# Patient Record
Sex: MALE | Race: OTHER
[De-identification: names, ages, dates, MRNs, and addresses within clinical notes are randomized per-mention and may not be internally consistent; named-entity substitution may affect disease eponyms.]

---

## 2020-09-28 NOTE — EDM.PDOC
ED [facility] GENERAL MEDICAL PROBLEM





- General


Chief Complaint: Genitourinary Problem


Stated Complaint: testicular pain


Time Seen by Provider: 09/28/20 19:40





- History of Present Illness


INITIAL COMMENTS - FREE TEXT/NARRATIVE: 





HISTORY AND PHYSICAL:





History of present illness:


This 24-year-old male who denies any significant past medical history presents 

to the emergency department complaining of right-sided testicular pain.  He com

plains that he was struck in the testicles and this was an accident.  It was 

done by his son while he was playing.  He was also in the pool doing flips when 

he smacked his testicle.  This was very painful to him and he could not stand 

any longer so he came to the emergency department.  No history of hernias.  No 

hematuria.  Denies any other symptoms.  Rates his pain as moderate.





Review of systems: 


A 10-point review of systems, other than pertinent positives and negatives as 

stated per HPI, is otherwise negative.





Past medical history: 


As per history of present illness and as reviewed below otherwise 

noncontributory.





Surgical history: 


As per history of present illness and as reviewed below otherwise 

noncontributory.





Social history: 


No reported history of drug or alcohol abuse.





Family history: 


As per history of present illness and as reviewed below otherwise 

noncontributory.





Physical exam:


VITAL SIGNS:  Reviewed.


GENERAL: In no apparent distress.


HEAD: No signs of head trauma.


EYES: Pupils are equal.  Extraocular motions intact.


EARS: Hearing grossly intact.


MOUTH: Oropharynx is normal.


NECK: No adenopathy, no JVD.   


CHEST: Chest with clear breath sounds bilaterally.  No wheezes, rales, or 

rhonchi.


CARDIAC: Regular rate and rhythm.  Normal S1 and S2, without murmurs, gallops, 

or rubs.


VASCULAR: Peripheral pulses normal and equal in all extremities.


ABDOMEN: Soft, without detectable tenderness.  No sign of distention.  No   

rebound or guarding, and no masses palpated.


MUSCULOSKELETAL: Good range of motion of all major joints. Extremities without 

clubbing, cyanosis or edema.


NEUROLOGIC EXAM: Alert and oriented x 3.  No focal sensory or motor deficits.  

Speech normal.  Follows commands.


PSYCHIATRIC: Mood normal.


SKIN: No rash or lesions.








Initial Differential Diagnosis & Plan:


Ultrasound, UA, rule out torsion, testicular trauma, atypical presentation of 

mass lesion, epididymitis, varicocele.








Definitive disposition and diagnosis as appropriate pending reevaluation and 

review of above.








  ** left testicle


Pain Score (Numeric/FACES): 6





- Related Data


                                    Allergies











Allergy/AdvReac Type Severity Reaction Status Date / Time


 


No Known Allergies Allergy   Verified 09/28/20 18:57











Home Meds: 


                                    Home Meds





. [No Known Home Meds]  01/30/19 [History]











Past Medical History





- Past Health History


Medical/Surgical History: Denies Medical/Surgical History





- Past Surgical History


Male  Surgical History: Reports: Vasectomy





Social & Family History





- Family History


Family Medical History: Noncontributory





- Tobacco Use


Smoking Status *Q: Never Smoker





- Caffeine Use


Caffeine Use: Reports: None





- Recreational Drug Use


Recreational Drug Use: No





ED ROS GENERAL





- Review of Systems


Review Of Systems: See Below (noted)





ED EXAM, GI/ABD





- Physical Exam


Exam: See Below (noted)





Course





- Vital Signs


Last Recorded V/S: 


                                Last Vital Signs











Temp  96 F L  09/28/20 18:55


 


Pulse  115 H  09/28/20 18:55


 


Resp  14   09/28/20 18:55


 


BP  141/77 H  09/28/20 18:55


 


Pulse Ox  98   09/28/20 18:55








The patient is found to have an epididymal cyst and an epididymal hematoma.  I 

suspect the epididymal cyst is likely an incidental finding.  He is not having 

pain, dysuria, or pyuria.  His UA is essentially normal.  I do not feel that he 

needs empiric treatment for gonorrhea chlamydia at this point.  He had localized

 trauma which explains epididymal hematoma.  No other findings today.





My diagnostic impression:


1.  Epididymal hematoma secondary to localized trauma


2.  Incidental finding of epididymal cyst





Follow-up with urology, support for pain, NSAIDs.





- Orders/Labs/Meds


Orders: 


                               Active Orders 24 hr











 Category Date Time Status


 


 Scrotal Duplex Ltd [US] Routine Exams  09/28/20 Taken


 


 CHLAMYDIA AND GONORRHEA BY [facility]  09/28/20 18:53 Received











Labs: 


                                Laboratory Tests











  09/28/20 Range/Units





  18:53 


 


Urine Color  YELLOW  


 


Urine Appearance  CLEAR  


 


Urine pH  5.5  (5.0-8.0)  


 


Ur Specific Gravity  <= 1.005  (1.001-1.035)  


 


Urine Protein  NEGATIVE  (NEGATIVE)  mg/dL


 


Urine Glucose (UA)  NEGATIVE  (NEGATIVE)  mg/dL


 


Urine Ketones  NEGATIVE  (NEGATIVE)  mg/dL


 


Urine Occult Blood  NEGATIVE  (NEGATIVE)  


 


Urine Nitrite  NEGATIVE  (NEGATIVE)  


 


Urine Bilirubin  NEGATIVE  (NEGATIVE)  


 


Urine Urobilinogen  0.2  (<2.0)  EU/dL


 


Ur Leukocyte Esterase  NEGATIVE  (NEGATIVE)  


 


Urine RBC  NONE SEEN  (0-2/HPF)  


 


Urine WBC  0-1  (0-5/HPF)  


 


Ur Epithelial Cells  NOT SEEN  (NONE-FEW)  


 


Urine Bacteria  RARE  (NEGATIVE)  














Departure





- Departure


Time of Disposition: 20:33


Disposition: Refer to Observation


Clinical Impression: 


 Epididymal cyst, Disorder of epididymis








- Discharge Information


*PRESCRIPTION DRUG MONITORING PROGRAM REVIEWED*: Not Applicable


*COPY OF PRESCRIPTION DRUG MONITORING REPORT IN PATIENT JEAN CLAUDE: Not Applicable


Referrals: 


Sydni Beasley DO [Primary Care Provider] - 


Forms:  ED Department Discharge


Additional Instructions: 


The following information is given to patients seen in the emergency department 

who are being discharged to home. This information is to outline your options 

for follow-up care. We provide all patients seen in our emergency department 

with a follow-up referral.





The need for follow-up, as well as the timing and circumstances, are variable 

depending upon the specifics of your emergency department visit.





If you don't have a primary care physician on staff, we will provide you with a 

referral. We always advise you to contact your personal physician following an 

emergency department visit to inform them of the circumstance of the visit and 

for follow-up with them and/or the need for any referrals to a consulting 

specialist.





The emergency department will also refer you to a specialist when appropriate. 

This referral assures that you have the opportunity for follow-up care with a 

specialist. All of these measure are taken in an effort to provide you with 

optimal care, which includes your follow-up.





Thank you for coming to the [facility] urgency department for 

your care today.  It was Dr. Maynard's pleasure to take care of you.





[facility] - Urology


[address] [place] [id number]


Phone: [phone]


Fax: [phone]





The localized trauma to your scrotum has created a hematoma in your epididymis. 

You also have an epididymal cyst.  Epididymal cysts are normal.  They happen in 

many conditions and are likely benign.  If it has not been bothering you prior 

to this you likely have no reason to worry about it.  Your epididymal hematoma 

will likely resolve.  This came from localized trauma.  Return for fever, 

uncontrolled pain, vomiting or any other concerns.  We are always happy to see 

you.





Under all circumstances we always encourage you to contact your private 

physician who remains a resource for coordinating your care. When calling for 

follow-up care, please make the office aware that this follow-up is from your 

recent emergency room visit. If for any reason you are refused follow-up, please

contact the [facility] Emergency Department

at [phone] and asked to speak to the emergency department charge nurse.








Sepsis Event Note (ED)





- Evaluation


Sepsis Screening Result: No Definite Risk





- Focused Exam


Vital Signs: 


                                   Vital Signs











  Temp Pulse Resp BP Pulse Ox


 


 09/28/20 18:55  96 F L  115 H  14  141/77 H  98














- My Orders


Last 24 Hours: 


My Active Orders





09/28/20


Scrotal Duplex Ltd [US] Routine 














- Assessment/Plan


Last 24 Hours: 


My Active Orders





09/28/20


Scrotal Duplex Ltd [US] Routine

## 2020-09-28 NOTE — US
Testicular ultrasound: Multiple real-time images of both testicles 

were obtained.  Real-time and Doppler evaluation was obtained.

 

Comparison: No prior testicular imaging is available.

 

Findings: Both testicles have a homogeneous ultrasound appearance.  

Both venous and arterial blood flow are seen.

 

Small epididymal cyst noted on the left side measuring 3.5 mm.  

Hypoechoic area is seen within the left epididymis measuring 8 mm 

which is nonspecific.  Small epididymal hematoma is within the 

differential.  

 

Small epididymal cyst is noted on the right side measuring 4 mm.

 

Measurements:

Right testicle: 3.7 x 2.2 x 2.4 cm

Left testicle: 4.2 x 2.1 x 2.8 cm

 

Impression:

1.  No intratesticular abnormality is seen.

2.  Small epididymal cyst on both sides.

3.  8 mm hypoechoic area within the left epididymis which is 

nonspecific.  Small epididymal hematoma is within the differential.

 

Note: Recommend follow-up testicular ultrasound in 6 months to make 

sure the hypoechoic area within the epididymis resolves or is stable. 

 If patient's symptoms do not resolve, earlier ultrasound could be 

obtained.

 

Diagnostic code #3

 

This report was dictated in MDT

## 2020-09-29 NOTE — US
EXAM DATE: 09/28/20



PATIENT'S AGE: 24



Testicular ultrasound: Multiple real-time images of both testicles 

were obtained.  Real-time and Doppler evaluation was obtained.

 

Comparison: No prior testicular imaging is available.

 

Findings: Both testicles have a homogeneous ultrasound appearance.  

Both venous and arterial blood flow are seen.

 

Small epididymal cyst noted on the left side measuring 3.5 mm.  

Hypoechoic area is seen within the left epididymis measuring 8 mm 

which is nonspecific.  Small epididymal hematoma is within the 

differential.  

 

Small epididymal cyst is noted on the right side measuring 4 mm.

 

Measurements:

Right testicle: 3.7 x 2.2 x 2.4 cm

Left testicle: 4.2 x 2.1 x 2.8 cm

 

Impression:

1.  No intratesticular abnormality is seen.

2.  Small epididymal cyst on both sides.

3.  8 mm hypoechoic area within the left epididymis which is 

nonspecific.  Small epididymal hematoma is within the differential.

 

Note: Recommend follow-up testicular ultrasound in 6 months to make 

sure the hypoechoic area within the epididymis resolves or is stable. 

 If patient's symptoms do not resolve, earlier ultrasound could be 

obtained.

 

Diagnostic code #3

 

This report was dictated in MDT

 

Report Signed by Proxy.

YVONNE

## 2023-02-18 ENCOUNTER — HOSPITAL ENCOUNTER (EMERGENCY)
Dept: HOSPITAL 56 - MW.ED | Age: 28
Discharge: HOME | End: 2023-02-18
Payer: MEDICAID

## 2023-02-18 DIAGNOSIS — Z20.822: ICD-10-CM

## 2023-02-18 DIAGNOSIS — M96.89: Primary | ICD-10-CM

## 2023-02-18 LAB
BUN SERPL-MCNC: 21 MG/DL (ref 7–18)
CHLORIDE SERPL-SCNC: 99 MMOL/L (ref 98–107)
CO2 SERPL-SCNC: 25.4 MMOL/L (ref 21–32)
EGFRCR SERPLBLD CKD-EPI 2021: 70 ML/MIN (ref 60–?)
FLUAV RNA UPPER RESP QL NAA+PROBE: NEGATIVE
FLUBV RNA UPPER RESP QL NAA+PROBE: NEGATIVE
GLUCOSE SERPL-MCNC: 120 MG/DL (ref 74–106)
LIPASE SERPL-CCNC: 114 U/L (ref 73–393)
POTASSIUM SERPL-SCNC: 3.2 MMOL/L (ref 3.5–5.1)
RSV RNA UPPER RESP QL NAA+PROBE: NEGATIVE
SARS-COV-2 RNA RESP QL NAA+PROBE: NEGATIVE
SODIUM SERPL-SCNC: 138 MMOL/L (ref 136–148)

## 2023-02-18 PROCEDURE — 96366 THER/PROPH/DIAG IV INF ADDON: CPT

## 2023-02-18 PROCEDURE — 99284 EMERGENCY DEPT VISIT MOD MDM: CPT

## 2023-02-18 PROCEDURE — 86901 BLOOD TYPING SEROLOGIC RH(D): CPT

## 2023-02-18 PROCEDURE — 0241U: CPT

## 2023-02-18 PROCEDURE — 85652 RBC SED RATE AUTOMATED: CPT

## 2023-02-18 PROCEDURE — 86140 C-REACTIVE PROTEIN: CPT

## 2023-02-18 PROCEDURE — 96365 THER/PROPH/DIAG IV INF INIT: CPT

## 2023-02-18 PROCEDURE — 96368 THER/DIAG CONCURRENT INF: CPT

## 2023-02-18 PROCEDURE — 85610 PROTHROMBIN TIME: CPT

## 2023-02-18 PROCEDURE — 87040 BLOOD CULTURE FOR BACTERIA: CPT

## 2023-02-18 PROCEDURE — 80053 COMPREHEN METABOLIC PANEL: CPT

## 2023-02-18 PROCEDURE — 85730 THROMBOPLASTIN TIME PARTIAL: CPT

## 2023-02-18 PROCEDURE — 85025 COMPLETE CBC W/AUTO DIFF WBC: CPT

## 2023-02-18 PROCEDURE — 83690 ASSAY OF LIPASE: CPT

## 2023-02-18 PROCEDURE — 36415 COLL VENOUS BLD VENIPUNCTURE: CPT

## 2023-02-18 PROCEDURE — 76882 US LMTD JT/FCL EVL NVASC XTR: CPT

## 2023-02-18 PROCEDURE — 86850 RBC ANTIBODY SCREEN: CPT

## 2023-02-18 PROCEDURE — 86900 BLOOD TYPING SEROLOGIC ABO: CPT

## 2023-02-18 PROCEDURE — 96361 HYDRATE IV INFUSION ADD-ON: CPT

## 2023-02-18 PROCEDURE — 83605 ASSAY OF LACTIC ACID: CPT

## 2023-03-08 ENCOUNTER — HOSPITAL ENCOUNTER (EMERGENCY)
Dept: HOSPITAL 56 - MW.ED | Age: 28
Discharge: HOME | End: 2023-03-08
Payer: MEDICAID

## 2023-03-08 DIAGNOSIS — Z02.89: Primary | ICD-10-CM
